# Patient Record
Sex: MALE | Race: BLACK OR AFRICAN AMERICAN | NOT HISPANIC OR LATINO | URBAN - METROPOLITAN AREA
[De-identification: names, ages, dates, MRNs, and addresses within clinical notes are randomized per-mention and may not be internally consistent; named-entity substitution may affect disease eponyms.]

---
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
## 2018-06-05 DIAGNOSIS — R00.1 BRADYCARDIA: Primary | ICD-10-CM

## 2018-08-02 ENCOUNTER — OFFICE VISIT (OUTPATIENT)
Dept: PEDIATRIC CARDIOLOGY | Facility: CLINIC | Age: 18
End: 2018-08-02
Payer: MEDICAID

## 2018-08-02 VITALS
SYSTOLIC BLOOD PRESSURE: 118 MMHG | DIASTOLIC BLOOD PRESSURE: 70 MMHG | WEIGHT: 186.19 LBS | HEART RATE: 57 BPM | OXYGEN SATURATION: 99 % | HEIGHT: 69 IN | RESPIRATION RATE: 20 BRPM | BODY MASS INDEX: 27.58 KG/M2

## 2018-08-02 DIAGNOSIS — R93.1 SUBOPTIMAL ECHOCARDIOGRAM: ICD-10-CM

## 2018-08-02 DIAGNOSIS — R00.1 BRADYCARDIA: ICD-10-CM

## 2018-08-02 PROCEDURE — 93000 ELECTROCARDIOGRAM COMPLETE: CPT | Mod: S$GLB,,, | Performed by: PEDIATRICS

## 2018-08-02 PROCEDURE — 99213 OFFICE O/P EST LOW 20 MIN: CPT | Mod: S$GLB,,, | Performed by: NURSE PRACTITIONER

## 2018-08-02 NOTE — LETTER
August 2, 2018      Moi Day MD  [address] [place] [id number]           [facility]  [address] [facility] [address]  Phone: [phone]  Fax: [phone]          Patient: Isiah Milligan   MR Number: [id number]   YOB: 2000   Date of Visit: 8/2/2018       Dear Dr. Moi Day:    Thank you for referring Isiah Milligan to me for evaluation. Attached you will find relevant portions of my assessment and plan of care.    If you have questions, please do not hesitate to call me. I look forward to following Isiah Milligan along with you.    Sincerely,    DALJIT Miles,PNP-C    Enclosure  CC:  No Recipients    If you would like to receive this communication electronically, please contact [email or web address] or [phone] to request more information on [employer] Link access.    For providers and/or their staff who would like to refer a patient to [facility], please contact us through our one-stop-shop provider referral line, [facility], at [phone].    If you feel you have received this communication in error or would no longer like to receive these types of communications, please e-mail [email or web address]

## 2018-08-02 NOTE — PROGRESS NOTES
"[facility]  Isiah Milligan  2000    Isiah Milligan is a 17 y.o. male presenting for follow-up of history of MVP and MR - not noted on last echo.  Isiah is here unaccompanied today.    HPI  Isiah was initially sent for cardiac evaluation in 2008 for chest pain. He was subsequently diagnosed with mitral valve prolapse and mitral regurgitation. He was last seen in clinic in July 2017 and was reportedly doing well from a cardiac standpoint. Exam that day was normal with no murmurs, so he was scheduled for echo with plan for open appointment. However, echo warranted follow-up and he was asked to return in 1 year and comes today as requested. Since the last visit, Isiah has done well overall with no major illnesses or hospitalizations.       Current Medications:   Previous Medications    No medications on file     Allergies: Review of patient's allergies indicates:  No Known Allergies    Family History   Problem Relation Age of Onset    Hypertension Mother     No Known Problems Father     No Known Problems Sister     No Known Problems Brother     Hypertension Maternal Grandmother     Cancer Maternal Grandfather     Other Paternal Grandmother         bypass surgery     Cancer Paternal Grandmother     Cancer Paternal Grandfather     Congenital heart disease Cousin         "born with several holes in her heart"    Arrhythmia Neg Hx     Cardiomyopathy Neg Hx     Heart attacks under age 50 Neg Hx     Long QT syndrome Neg Hx     Pacemaker/defibrilator Neg Hx      Past Medical History:   Diagnosis Date    Abnormal finding on echocardiogram     LV/AO root increased for age    Bradycardia     Overweight      Social History     Social History    Marital status: Single     Spouse name: N/A    Number of children: N/A    Years of education: N/A     Social History Main Topics    Smoking status: Not on file    Smokeless tobacco: Not on file    Alcohol use Not on file    Drug use: Unknown " "   Sexual activity: Not on file     Other Topics Concern    Not on file     Social History Narrative    Currently working a rapper.     Exercising regularly now, but started that recently. Typically lifts weights up to 205lb    Appetite is good.      Past Surgical History:   Procedure Laterality Date    TONSILLECTOMY  2010     Birth History    Birth     Weight: 3.544 kg (7 lb 13 oz)    Gestation Age: 40 wks       Review of Systems   Constitutional: Negative for activity change, appetite change and fatigue.   Respiratory: Negative for shortness of breath, wheezing and stridor.    Cardiovascular: Negative for chest pain and palpitations.   Gastrointestinal: Negative.    Genitourinary: Negative.    Musculoskeletal: Negative.    Skin: Negative for color change and rash.   Neurological: Negative for dizziness, seizures, syncope, weakness and headaches.   Hematological: Does not bruise/bleed easily.       Objective:   Vitals:    08/02/18 1345   BP: 118/70   BP Location: Right arm   Patient Position: Lying   BP Method: Thigh Cuff (Manual)   Pulse: (!) 57   Resp: 20   SpO2: 99%   Weight: 84.5 kg (186 lb 3 oz)   Height: 5' 9.21" (1.758 m)       Physical Exam   Constitutional: He is oriented to person, place, and time. Vital signs are normal. He appears well-developed and well-nourished. He is active and cooperative. No distress.   HENT:   Head: Normocephalic.   Neck: Normal range of motion.   Cardiovascular: Normal rate, regular rhythm, S1 normal, S2 normal and normal heart sounds.   No extrasystoles are present. Exam reveals no S3 and no S4.    No murmur heard.  Pulses:       Radial pulses are 2+ on the right side.        Femoral pulses are 2+ on the right side.  There are no clicks, rumbles, rubs, lifts, taps, or thrills noted.   Pulmonary/Chest: Effort normal and breath sounds normal. No respiratory distress. He exhibits no deformity.   Abdominal: Soft. Normal appearance and bowel sounds are normal. He exhibits no " distension. There is no hepatosplenomegaly.   There are no abdominal bruits noted.   Musculoskeletal: Normal range of motion.   Neurological: He is alert and oriented to person, place, and time.   Skin: Skin is warm and dry. No rash noted. No cyanosis. Nails show no clubbing.   Psychiatric: He has a normal mood and affect. His speech is normal and behavior is normal.   Nursing note and vitals reviewed.      Tests:   Today's EKG interpretation by Dr. Oropeza reveals: normal sinus rhythm with QRS axis +38 degrees in the frontal plane. There is no atrial enlargement or ventricular hypertrophy noted. Early repolarization is noted.  (Final report in electronic medical record)    Echocardiogram:   Pertinent Echocardiographic findings from the Echo dated 7/27/17 are:   LCA appears normal but RCA not seen well  LVID increased for age (5.4cm, z-score 0.01)  Aortic root increased for age (3.1cm, z-score +0.14)  Otherwise normal findings for age  (Full report in electronic medical record)      Assessment:  1. Bradycardia    2. Suboptimal echocardiogram        Discussion:   Dr. Oropeza reviewed history and physical exam. He then performed the physical exam. He discussed the findings with the patient's caregiver(s), and answered all questions.    Isiah has a normal cardiac examination. His last echo was limited in views of pulmonary veins and coronary arteries. His LVID and aortic root, though full for age, were normal for z-scores. We will repeat echo in the near future to confirm normal anatomy.    I have reviewed our general guidelines related to cardiac issues with the family.  I instructed them in the event of an emergency to call 911 or go to the nearest emergency room.  They know to contact the PCP if problems arise or if they are in doubt.      Plan:    1. Activity:No activity restrictions are indicated at this time. Activities may include endurance training, interscholastic athletic, competition and contact sports.    2. No  endocarditis prophylaxis is recommended in this circumstance.     3. Medications:   No current outpatient prescriptions on file.     No current facility-administered medications for this visit.      4. Orders placed this encounter  Orders Placed This Encounter   Procedures    Echocardiogram pediatric     5. Follow up with the primary care provider for the following issues: Nothing identified.      Follow-Up:   I will repeat an echo in the near future. If the echo is normal, I will put Isiah to an open appointment. This means that I will be happy to see him in the future if there are issues or if you think I need to but will not give him a follow up visit at this time. If the echo findings require follow-up, I will schedule an appropriate visit at that time. The caregiver has been asked to call for results and follow-up instructions about one week after the echo has been done.     If the echo is normal and he is put to open appointment, then future cardiac concerns should be directed to adult cardiology.      Sincerely,    Dewey Oropeza MD    Note Contributing Authors:  MD Kayleigh Forbes APRN, PNP-C

## 2018-08-02 NOTE — PATIENT INSTRUCTIONS
Dewey Oropeza MD  Pediatric Cardiology  [address] [place] [address]  [phone]    General Guidelines    Name: Isiah Milligan                   : 2000    Diagnosis:   1. Bradycardia    2. Suboptimal echocardiogram        PCP: Moi Day MD  PCP Phone Number: [phone]    · If you have an emergency or you think you have an emergency, go to the nearest emergency room!     · Breathing too fast, doesnt look right, consistently not eating well, your child needs to be checked. These are general indications that your child is not feeling well. This may be caused by anything, a stomach virus, an ear ache or heart disease, so please call Moi Day MD. If Moi Day MD thinks you need to be checked for your heart, they will let us know.     · If your child experiences a rapid or very slow heart rate and has the following symptoms, call Moi Day MD or go to the nearest emergency room.   · unexplained chest pain   · does not look right   · feels like they are going to pass out   · actually passes out for unexplained reasons   · weakness or fatigue   · shortness of breath  or breathing fast   · consistent poor feeding     · If your child experiences a rapid or very slow heart rate that lasts longer than 30 minutes call Moi Day MD or go to the nearest emergency room.     · If your child feels like they are going to pass out - have them sit down or lay down immediately. Raise the feet above the head (prop the feet on a chair or the wall) until the feeling passes. Slowly allow the child to sit, then stand. If the feeling returns, lay back down and start over.     It is very important that you notify Moi Day MD first. Moi Day MD or the ER Physician can reach Dr. Dewey Oropeza at the office or through [facility] P[facility] at [phone] as needed.    Call our office ([phone]) one week after ALL tests for results.

## 2018-10-18 ENCOUNTER — CLINICAL SUPPORT (OUTPATIENT)
Dept: PEDIATRIC CARDIOLOGY | Facility: CLINIC | Age: 18
End: 2018-10-18
Attending: NURSE PRACTITIONER
Payer: MEDICAID

## 2018-10-18 DIAGNOSIS — R93.1 SUBOPTIMAL ECHOCARDIOGRAM: ICD-10-CM

## 2018-10-18 DIAGNOSIS — R00.1 BRADYCARDIA: ICD-10-CM

## 2018-10-23 ENCOUNTER — TELEPHONE (OUTPATIENT)
Dept: PEDIATRIC CARDIOLOGY | Facility: CLINIC | Age: 18
End: 2018-10-23

## 2018-10-23 NOTE — TELEPHONE ENCOUNTER
Phoned mom reviewed echo results.  BSA 2.0 m2.  There are 4 chambers with normally aligned great vessels.  Chamber sizes are qualitatively normal.  There is good LV function.  There are no shunts noted.  Physiological TR, PI.  The right coronary artery and left coronary are patent by 2D.  Mild MR  LA Volume 25 ml/m2  RVSP 18 mmHg  LV Lateral Tissue Doppler WNL  TAPSE 15 mm  Clinical Correlation Suggested  Follow Up Warranted  Selective IE Recommended   Advised mom per Kayleigh's review- mild MR can occur over time with age, HTN, lifting weights, being overweight. Advised mom Kayleigh recommends that he be seen on an annual basis, either here or with adult cardiologist wherever he is living. Reviewed SIE.Mom verbalizes understanding and denies questions.   Placed recall.   Verified address with mom. Mailed SIE letter.

## 2018-10-23 NOTE — LETTER
[facility]  [address] [address]  Phone: [phone]  Fax: [phone]     PREVENTION OF BACTERIAL ENDOCARDITIS (selective IE)    A COPY OF THIS SHEET MUST BE GIVEN TO ALL OF YOUR DOCTORS OR HEALTH CARE PROVIDERS    You have received this information because you are at an increased risk for developing adverse outcomes from infective endocarditis (IE), also known as subacute bacterial endocarditis (SBE).    Patient Name:  Isiah Milligan    : 2000   Diagnosis: Mild MR    As of 10/23/2018, Dewey Oropeza MD, Pediatric Cardiologist recommends that Isiah receive SELECTIVE USE of antibiotic prophylaxis from bacterial endocarditis.    Antibiotic prophylaxis with dental or surgical procedures is recommended in selected instances if your dentist, surgeon or physician believes there is a greater risk of infection.  For example:  1) Any significantly infected operative field (Example: dental abscess or ruptured appendix) which may increase the bacterial load to the blood stream during the procedure; 2) Benefits of antibiotic coverage should be weighed against risk of allergic reactions and anaphylaxis; therefore, their use should be carefully selected based on individual cases.     Antibiotic prophylaxis is NOT recommended for the following dental procedures or events: routine anesthetic injections through non-infected tissue; taking dental radiographs; placement of removable prosthodontic or orthodontic appliances; adjustment of orthodontic appliances; placement of orthodontic brackets; and shedding of deciduous teeth or bleeding from trauma to the lips or oral mucosa.   If recommended by the Health Care Provider - Antibiotic Prophylactic Regimens   Regimen - Single Dose 30-60 minutes before Procedure  Situation Agent Adults Children   Oral Amoxicillin 2g 50/mg/kg   Unable to take oral meds Ampicillin   OR  Cefazolin or ceftriaxone 2 g IM or IV1    1 g IM or IV 50 mg/kg  IM or IV    50 mg/kg IM or IV   Allergic to Penicillins or ampicillin-Oral regimen Cephalexin 2  OR  Clindamycin  OR  Azithromycin or clarithromycin 2 g    600 mg    500 mg 50 mg/kg    20 mg/kg    15 mg/kg   Allergic to penicillin or ampicillin and unable to take oral medications Cefazolin or ceftriaxone 3  OR  Clindamycin 1 g IM or IV    600 mg IM or IV 50 mg/kg IM or IV    20 mg/kg IM or IV   1IM - intramuscular; IV - intravenous  2Or other first or second generation oral cephalosporin in equivalent adult or pediatric dosage.  3Cephalosporins should not be used in an individual with a history of anaphylaxis, angioedema or urticaria with penicillin or ampicillin.   Adapted from Prevention of Infective Endocarditis: Guidelines From the American Heart Association, by the Committee on Rheumatic Fever, Endocarditis, and Kawasaki Disease. Circulation, e-published April 19, 2007. Go to www.americanheart.org/presenter for more information.    The practice of giving patients antibiotics prior to a dental procedure is no longer recommended EXCEPT for patients with the highest risk of adverse outcomes resulting from bacterial endocarditis. We cannot exclude the possibility that an exceedingly small number of cases, if any, of bacterial endocarditis may be prevented by antibiotic prophylaxis prior to a dental procedure. The importance of good oral and dental health and regular visits to the dentist is important for patients at risk for bacterial endocarditis.  Gastrointestinal (GI)/Genitourinary () Procedures: Antibiotic prophylaxis solely to prevent bacterial endocarditis is no longer recommended for patients who undergo a GI or  tract procedures, including patients with the highest risk of adverse outcomes due to bacterial endocarditis.    Good dental health and hygiene is very effective in preventing bacterial endocarditis.   Always practice good dental health!

## 2018-10-23 NOTE — TELEPHONE ENCOUNTER
----- Message from DALJIT Miles,PNP-C sent at 10/23/2018  8:29 AM CDT -----  Please call patient and review echo - mild MR. This can occur over time with age, hypertension, lifting weights, being overweight, etc. We do recommend that he be seen on an annual basis, either here or with adult cardiologist wherever he is living. Please review SIE and explain that this is a slight variation from AHA recommendations. If he wants to be seen here, please put in recall.